# Patient Record
(demographics unavailable — no encounter records)

---

## 2025-03-18 NOTE — HISTORY OF PRESENT ILLNESS
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 The patient received chemoradiation therapy that she completed in 04/2019  [FreeTextEntry1] : Current H&N Symptoms:   Bleeding - Denies Pain - Denies Trismus - Denies Dysphagia - Denies Odynophagia - Denies Voice Changes - Denies Otalgia - Denies Tinnitus - Denies Hearing loss - Denies Visual Changes - Denies Neck Mass - Denies SOB - Denies Decreased tongue mobility - Denies Facial nerve weakness/paresthesia - Denies Other - Denies

## 2025-03-18 NOTE — ASSESSMENT
[FreeTextEntry1] : The patient returns for a follow-up visit.  On my examination I do not identify any changes that are concerning for recurrent cancer.  She is now 5 years out from her initial treatment.  Chance of recurrence is very low.  I told her to go and follow-up with the local physicians.  I will see her again if required.  She knows I remain available should changes or issues arise.

## 2025-03-18 NOTE — PHYSICAL EXAM
[de-identified] : Examination is limited to the head and neck area. The patient is a well-nourished individual who is in no acute distress and appears their stated age.   Examination of the head and neck skin reveals radiation changes but nothing that is of concern for malignancy.   There are no palpable masses in the parotid or submandibular regions. The jugular and posterior cervical chains are devoid of palpable adenopathy. There are no palpable diseases within the central compartment.    Facial nerve function is intact. No other cranial nerve deficits are identified.   The patient is able to open their mouth fully. There is no restriction in tongue mobility. Visualization and palpation of the contents of the oral cavity shows no evidence of mucosal or submucosal pathology.   Visualization and palpation in the oropharynx show no lesion in the base of the tongue, tonsil or soft palate.   Mirror examination of the oropharynx, larynx and hypopharynx shows no obvious mucosal pathology. The vocal cords are normally mobile and meet in the midline.

## 2025-03-18 NOTE — ADDENDUM
[FreeTextEntry1] : Documented by Isaías Schultz acting as a scribe for Dr. Ramya Daley on 3/18/2025. All medical record entries made by the Scribe were at my, Dr. Ramya Daley, direction and personally dictated by me on 3/18/2025. I have reviewed the chart and agree that the record accurately reflects my personal performance of the history, physical exam, assessment and plan. I have also personally directed, reviewed, and agree with the discharge instructions.

## 2025-03-27 NOTE — DISCUSSION/SUMMARY
[FreeTextEntry1] : I reassured the patient that her cardiac status was stable, and there was no need for any testing at this point. I will see her again in 6-9  months. Maintain the same meds.but added Losartan 25mg QD EKG normal; today Added zetia to further reduce her lipids Lipid profile to follow.  The numbers were good but her liver enzymes went up a little bit more so she will stop the atorvastatin and get another blood test after a month.  This may require switching her to Repatha. Cardiac CTA showed a high calcium score of 1100, moderate plaque in the LAD, patent stent in the circumflex, occluded stent in the proximal right coronary artery with collateral filling of the distal right from the left. [EKG obtained to assist in diagnosis and management of assessed problem(s)] : EKG obtained to assist in diagnosis and management of assessed problem(s)

## 2025-03-27 NOTE — PHYSICAL EXAM
[Normal Appearance] : normal appearance [Well Groomed] : well groomed [No Deformities] : no deformities [General Appearance - In No Acute Distress] : no acute distress [Normal Conjunctiva] : the conjunctiva exhibited no abnormalities [Eyelids - No Xanthelasma] : the eyelids demonstrated no xanthelasmas [Normal Oral Mucosa] : normal oral mucosa [No Oral Pallor] : no oral pallor [No Oral Cyanosis] : no oral cyanosis [Normal Jugular Venous A Waves Present] : normal jugular venous A waves present [Normal Jugular Venous V Waves Present] : normal jugular venous V waves present [No Jugular Venous Santiago A Waves] : no jugular venous santiago A waves [Respiration, Rhythm And Depth] : normal respiratory rhythm and effort [Exaggerated Use Of Accessory Muscles For Inspiration] : no accessory muscle use [Auscultation Breath Sounds / Voice Sounds] : lungs were clear to auscultation bilaterally [Heart Rate And Rhythm] : heart rate and rhythm were normal [Heart Sounds] : normal S1 and S2 [Murmurs] : no murmurs present [Abdomen Soft] : soft [Abdomen Tenderness] : non-tender [Abdomen Mass (___ Cm)] : no abdominal mass palpated [Abnormal Walk] : normal gait [Gait - Sufficient For Exercise Testing] : the gait was sufficient for exercise testing [Nail Clubbing] : no clubbing of the fingernails [Cyanosis, Localized] : no localized cyanosis [Petechial Hemorrhages (___cm)] : no petechial hemorrhages [Skin Color & Pigmentation] : normal skin color and pigmentation [] : no rash [No Venous Stasis] : no venous stasis [Skin Lesions] : no skin lesions [No Skin Ulcers] : no skin ulcer [No Xanthoma] : no  xanthoma was observed [Oriented To Time, Place, And Person] : oriented to person, place, and time [Affect] : the affect was normal [Mood] : the mood was normal [No Anxiety] : not feeling anxious [FreeTextEntry1] : Cachectic

## 2025-03-27 NOTE — REASON FOR VISIT
[Follow-Up - Clinic] : a clinic follow-up of [Coronary Artery Disease] : coronary artery disease [Hyperlipidemia] : hyperlipidemia [Hypertension] : hypertension [FreeTextEntry3] : Dr. Leach [FreeTextEntry1] : I saw this 55-year-old woman in followup consultation on   03/27/25 She had coronary stenting done twice more than 10 years ago and has had no recurrence since. She has been on antihypertensive and lipid medication and was doing well until last year when she was found to have squamous cell carcinoma of the tongue and throat. She has gone through a course of chemotherapy and radiation. Throughout all of this she has had no cardiac issues. She seems to be doing better and has put on some weight Routine visit asymptomatic. In remission Her blood pressure has been elevated the last 2 visits so I will start her on losartan 25 mg daily. Had a CTA which showed the LAD had moderate disease with plaque, circumflex stent was patent, but the right coronary artery stent was occluded with collateral filling of the distal right from the left.  She had a very high calcium score of 1100. Because of elevated liver enzymes we will stop the atorvastatin for a month and repeat the blood work.  If it comes down then we will try and get approval for Repatha.